# Patient Record
Sex: FEMALE | Race: WHITE | NOT HISPANIC OR LATINO | Employment: FULL TIME | URBAN - METROPOLITAN AREA
[De-identification: names, ages, dates, MRNs, and addresses within clinical notes are randomized per-mention and may not be internally consistent; named-entity substitution may affect disease eponyms.]

---

## 2024-10-30 ENCOUNTER — APPOINTMENT (OUTPATIENT)
Dept: CARDIOLOGY | Facility: HOSPITAL | Age: 39
End: 2024-10-30
Payer: COMMERCIAL

## 2024-10-30 ENCOUNTER — APPOINTMENT (OUTPATIENT)
Dept: GENERAL RADIOLOGY | Facility: HOSPITAL | Age: 39
End: 2024-10-30
Payer: COMMERCIAL

## 2024-10-30 ENCOUNTER — HOSPITAL ENCOUNTER (EMERGENCY)
Facility: HOSPITAL | Age: 39
Discharge: HOME | End: 2024-10-30
Attending: EMERGENCY MEDICINE
Payer: COMMERCIAL

## 2024-10-30 VITALS
RESPIRATION RATE: 16 BRPM | SYSTOLIC BLOOD PRESSURE: 103 MMHG | TEMPERATURE: 98.3 F | HEIGHT: 65 IN | BODY MASS INDEX: 39.15 KG/M2 | HEART RATE: 72 BPM | OXYGEN SATURATION: 94 % | DIASTOLIC BLOOD PRESSURE: 63 MMHG | WEIGHT: 235 LBS

## 2024-10-30 DIAGNOSIS — M79.89 LEG SWELLING: ICD-10-CM

## 2024-10-30 DIAGNOSIS — L03.115 CELLULITIS OF RIGHT LOWER EXTREMITY: Primary | ICD-10-CM

## 2024-10-30 LAB
ANION GAP SERPL CALCULATED.3IONS-SCNC: 6.6 MMOL/L (ref 5–15)
BASOPHILS # BLD AUTO: 0.03 10*3/MM3 (ref 0–0.2)
BASOPHILS NFR BLD AUTO: 0.6 % (ref 0–1.5)
BH CV LOWER VASCULAR LEFT COMMON FEMORAL AUGMENT: NORMAL
BH CV LOWER VASCULAR LEFT COMMON FEMORAL COMPETENT: NORMAL
BH CV LOWER VASCULAR LEFT COMMON FEMORAL COMPRESS: NORMAL
BH CV LOWER VASCULAR LEFT COMMON FEMORAL PHASIC: NORMAL
BH CV LOWER VASCULAR LEFT COMMON FEMORAL SPONT: NORMAL
BH CV LOWER VASCULAR RIGHT COMMON FEMORAL AUGMENT: NORMAL
BH CV LOWER VASCULAR RIGHT COMMON FEMORAL COMPETENT: NORMAL
BH CV LOWER VASCULAR RIGHT COMMON FEMORAL COMPRESS: NORMAL
BH CV LOWER VASCULAR RIGHT COMMON FEMORAL PHASIC: NORMAL
BH CV LOWER VASCULAR RIGHT COMMON FEMORAL SPONT: NORMAL
BH CV LOWER VASCULAR RIGHT DISTAL FEMORAL COMPRESS: NORMAL
BH CV LOWER VASCULAR RIGHT GASTRONEMIUS COMPRESS: NORMAL
BH CV LOWER VASCULAR RIGHT GREATER SAPH AK COMPRESS: NORMAL
BH CV LOWER VASCULAR RIGHT GREATER SAPH BK COMPRESS: NORMAL
BH CV LOWER VASCULAR RIGHT LESSER SAPH COMPRESS: NORMAL
BH CV LOWER VASCULAR RIGHT MID FEMORAL AUGMENT: NORMAL
BH CV LOWER VASCULAR RIGHT MID FEMORAL COMPETENT: NORMAL
BH CV LOWER VASCULAR RIGHT MID FEMORAL COMPRESS: NORMAL
BH CV LOWER VASCULAR RIGHT MID FEMORAL PHASIC: NORMAL
BH CV LOWER VASCULAR RIGHT MID FEMORAL SPONT: NORMAL
BH CV LOWER VASCULAR RIGHT PERONEAL COMPRESS: NORMAL
BH CV LOWER VASCULAR RIGHT POPLITEAL AUGMENT: NORMAL
BH CV LOWER VASCULAR RIGHT POPLITEAL COMPETENT: NORMAL
BH CV LOWER VASCULAR RIGHT POPLITEAL COMPRESS: NORMAL
BH CV LOWER VASCULAR RIGHT POPLITEAL PHASIC: NORMAL
BH CV LOWER VASCULAR RIGHT POPLITEAL SPONT: NORMAL
BH CV LOWER VASCULAR RIGHT POSTERIOR TIBIAL COMPRESS: NORMAL
BH CV LOWER VASCULAR RIGHT PROXIMAL FEMORAL COMPRESS: NORMAL
BH CV LOWER VASCULAR RIGHT SAPHENOFEMORAL JUNCTION COMPRESS: NORMAL
BH CV VAS PRELIMINARY FINDINGS SCRIPTING: 1
BUN SERPL-MCNC: 11 MG/DL (ref 6–20)
BUN/CREAT SERPL: 12.1 (ref 7–25)
CALCIUM SPEC-SCNC: 8.8 MG/DL (ref 8.6–10.5)
CHLORIDE SERPL-SCNC: 101 MMOL/L (ref 98–107)
CO2 SERPL-SCNC: 33.4 MMOL/L (ref 22–29)
CREAT SERPL-MCNC: 0.91 MG/DL (ref 0.57–1)
DEPRECATED RDW RBC AUTO: 48.6 FL (ref 37–54)
EGFRCR SERPLBLD CKD-EPI 2021: 82 ML/MIN/1.73
EOSINOPHIL # BLD AUTO: 0.27 10*3/MM3 (ref 0–0.4)
EOSINOPHIL NFR BLD AUTO: 5.3 % (ref 0.3–6.2)
ERYTHROCYTE [DISTWIDTH] IN BLOOD BY AUTOMATED COUNT: 14.7 % (ref 12.3–15.4)
GLUCOSE SERPL-MCNC: 67 MG/DL (ref 65–99)
HCT VFR BLD AUTO: 36.2 % (ref 34–46.6)
HGB BLD-MCNC: 11.2 G/DL (ref 12–15.9)
HOLD SPECIMEN: NORMAL
IMM GRANULOCYTES # BLD AUTO: 0.03 10*3/MM3 (ref 0–0.05)
IMM GRANULOCYTES NFR BLD AUTO: 0.6 % (ref 0–0.5)
LYMPHOCYTES # BLD AUTO: 1.36 10*3/MM3 (ref 0.7–3.1)
LYMPHOCYTES NFR BLD AUTO: 26.5 % (ref 19.6–45.3)
MCH RBC QN AUTO: 27.5 PG (ref 26.6–33)
MCHC RBC AUTO-ENTMCNC: 30.9 G/DL (ref 31.5–35.7)
MCV RBC AUTO: 88.9 FL (ref 79–97)
MONOCYTES # BLD AUTO: 0.35 10*3/MM3 (ref 0.1–0.9)
MONOCYTES NFR BLD AUTO: 6.8 % (ref 5–12)
NEUTROPHILS NFR BLD AUTO: 3.1 10*3/MM3 (ref 1.7–7)
NEUTROPHILS NFR BLD AUTO: 60.2 % (ref 42.7–76)
NRBC BLD AUTO-RTO: 0 /100 WBC (ref 0–0.2)
PLATELET # BLD AUTO: 179 10*3/MM3 (ref 140–450)
PMV BLD AUTO: 9.8 FL (ref 6–12)
POTASSIUM SERPL-SCNC: 3.9 MMOL/L (ref 3.5–5.2)
RBC # BLD AUTO: 4.07 10*6/MM3 (ref 3.77–5.28)
SODIUM SERPL-SCNC: 141 MMOL/L (ref 136–145)
WBC NRBC COR # BLD AUTO: 5.14 10*3/MM3 (ref 3.4–10.8)
WHOLE BLOOD HOLD COAG: NORMAL

## 2024-10-30 PROCEDURE — 93971 EXTREMITY STUDY: CPT

## 2024-10-30 PROCEDURE — 99284 EMERGENCY DEPT VISIT MOD MDM: CPT

## 2024-10-30 PROCEDURE — 85025 COMPLETE CBC W/AUTO DIFF WBC: CPT | Performed by: EMERGENCY MEDICINE

## 2024-10-30 PROCEDURE — 80048 BASIC METABOLIC PNL TOTAL CA: CPT | Performed by: EMERGENCY MEDICINE

## 2024-10-30 PROCEDURE — 73590 X-RAY EXAM OF LOWER LEG: CPT

## 2024-10-30 PROCEDURE — 93971 EXTREMITY STUDY: CPT | Performed by: SURGERY

## 2024-10-30 RX ORDER — CEPHALEXIN 500 MG/1
500 CAPSULE ORAL 4 TIMES DAILY
Qty: 28 CAPSULE | Refills: 0 | Status: SHIPPED | OUTPATIENT
Start: 2024-10-30

## 2024-10-30 RX ORDER — SODIUM CHLORIDE 0.9 % (FLUSH) 0.9 %
10 SYRINGE (ML) INJECTION AS NEEDED
Status: DISCONTINUED | OUTPATIENT
Start: 2024-10-30 | End: 2024-10-30 | Stop reason: HOSPADM

## 2024-10-30 NOTE — ED PROVIDER NOTES
Subjective   History of Present Illness  Chief complaint: Patient is a 39-year-old female who presents to the emergency department with pain and swelling to the right lower leg.  She states she had a mechanical fall a week ago.  After she fell she never thought anything of it.  She was not having any pain.  A few days ago she states she was sleeping with the right side of her leg hanging off of the bed.  When she awoke she noticed it was swollen.  Its persistently swollen and has had some erythema and redness to it.  She has no numbness or weakness.  She is having increasing pain.  She is not having any chest pain or shortness of breath.  She presented for further evaluation.    Context:    Duration:    Timing:    Severity:    Associated Symptoms:        PCP:  LMP:      Review of Systems   Respiratory:  Negative for shortness of breath.    Cardiovascular:  Negative for chest pain.   Gastrointestinal: Negative.    Genitourinary: Negative.    Musculoskeletal:  Positive for arthralgias and myalgias.   Skin:  Positive for color change.   Neurological:  Negative for weakness and numbness.       Past Medical History:   Diagnosis Date    Allergies        No Known Allergies    Past Surgical History:   Procedure Laterality Date    ABDOMINAL SURGERY  09/03/2022    Lap Nica    CHOLECYSTECTOMY N/A 09/03/2022    Procedure: CHOLECYSTECTOMY LAPAROSCOPIC;  Surgeon: Vikas Hurtado MD;  Location: [facility] OR;  Service: General;  Laterality: N/A;    DILATATION AND CURETTAGE  09/17/2010       Family History   Problem Relation Age of Onset    Alcaraz-Pj syndrome Mother        Social History     Socioeconomic History    Marital status:    Tobacco Use    Smoking status: Every Day     Current packs/day: 0.50     Types: Cigarettes    Smokeless tobacco: Never   Vaping Use    Vaping status: Never Used   Substance and Sexual Activity    Alcohol use: Never    Drug use: Defer    Sexual activity: Defer     Partners: Male            Objective   Physical Exam  Vitals and nursing note reviewed.   Constitutional:       Appearance: Normal appearance.   HENT:      Head: Normocephalic and atraumatic.   Musculoskeletal:      Right lower leg: Swelling present. Edema present.        Legs:       Comments: Tenderness posterior calf on the right.  There is edema and soft tissue swelling circumferentially from the lower calf down to the foot.  Neurovascular intact distally.   Skin:     General: Skin is warm.      Findings: Erythema present.          Neurological:      Mental Status: She is alert.         Procedures           ED Course                                     Results for orders placed or performed during the hospital encounter of 10/30/24   Light Blue Top    Collection Time: 10/30/24 10:20 AM   Result Value Ref Range    Extra Tube Hold for add-ons.    Basic Metabolic Panel    Collection Time: 10/30/24 10:25 AM    Specimen: Blood   Result Value Ref Range    Glucose 67 65 - 99 mg/dL    BUN 11 6 - 20 mg/dL    Creatinine 0.91 0.57 - 1.00 mg/dL    Sodium 141 136 - 145 mmol/L    Potassium 3.9 3.5 - 5.2 mmol/L    Chloride 101 98 - 107 mmol/L    CO2 33.4 (H) 22.0 - 29.0 mmol/L    Calcium 8.8 8.6 - 10.5 mg/dL    BUN/Creatinine Ratio 12.1 7.0 - 25.0    Anion Gap 6.6 5.0 - 15.0 mmol/L    eGFR 82.0 >60.0 mL/min/1.73   CBC Auto Differential    Collection Time: 10/30/24 10:25 AM    Specimen: Blood   Result Value Ref Range    WBC 5.14 3.40 - 10.80 10*3/mm3    RBC 4.07 3.77 - 5.28 10*6/mm3    Hemoglobin 11.2 (L) 12.0 - 15.9 g/dL    Hematocrit 36.2 34.0 - 46.6 %    MCV 88.9 79.0 - 97.0 fL    MCH 27.5 26.6 - 33.0 pg    MCHC 30.9 (L) 31.5 - 35.7 g/dL    RDW 14.7 12.3 - 15.4 %    RDW-SD 48.6 37.0 - 54.0 fl    MPV 9.8 6.0 - 12.0 fL    Platelets 179 140 - 450 10*3/mm3    Neutrophil % 60.2 42.7 - 76.0 %    Lymphocyte % 26.5 19.6 - 45.3 %    Monocyte % 6.8 5.0 - 12.0 %    Eosinophil % 5.3 0.3 - 6.2 %    Basophil % 0.6 0.0 - 1.5 %    Immature Grans % 0.6 (H)  0.0 - 0.5 %    Neutrophils, Absolute 3.10 1.70 - 7.00 10*3/mm3    Lymphocytes, Absolute 1.36 0.70 - 3.10 10*3/mm3    Monocytes, Absolute 0.35 0.10 - 0.90 10*3/mm3    Eosinophils, Absolute 0.27 0.00 - 0.40 10*3/mm3    Basophils, Absolute 0.03 0.00 - 0.20 10*3/mm3    Immature Grans, Absolute 0.03 0.00 - 0.05 10*3/mm3    nRBC 0.0 0.0 - 0.2 /100 WBC   Gold Top - SST    Collection Time: 10/30/24 10:25 AM   Result Value Ref Range    Extra Tube Hold for add-ons.    Duplex Venous Lower Extremity - RIGHT    Collection Time: 10/30/24 11:07 AM   Result Value Ref Range    Right Common Femoral Spont Y     Right Common Femoral Competent Y     Right Common Femoral Phasic Y     Right Common Femoral Compress C     Right Common Femoral Augment Y     Right Saphenofemoral Junction Compress C     Right Proximal Femoral Compress C     Right Mid Femoral Spont Y     Right Mid Femoral Competent Y     Right Mid Femoral Phasic Y     Right Mid Femoral Compress C     Right Mid Femoral Augment Y     Right Distal Femoral Compress C     Right Popliteal Spont Y     Right Popliteal Competent Y     Right Popliteal Phasic Y     Right Popliteal Compress C     Right Popliteal Augment Y     Right Posterior Tibial Compress C     Right Peroneal Compress C     Right Gastronemius Compress C     Right Greater Saph AK Compress C     Right Greater Saph BK Compress C     Right Lesser Saph Compress C     Left Common Femoral Spont Y     Left Common Femoral Competent Y     Left Common Femoral Phasic Y     Left Common Femoral Compress C     Left Common Femoral Augment Y     BH CV VAS PRELIMINARY FINDINGS SCRIPTING 1.0            XR Tibia Fibula 2 View Right    Result Date: 10/30/2024  Impression: Normal 2 views of the right tibia and fibula. Electronically Signed: Roselia Childress MD  10/30/2024 10:42 AM EDT  Workstation ID: [id number]       Medical Decision Making  Patient was seen evaluate for the above problem    Differential diagnosis includes but is not limited to  fibula fracture, tibia fracture, cellulitis, DVT    Patient had dopplerable pulses.  She does have significant soft tissue swelling to the distal leg.  There is some skin breakdown from superficial wound and erythema circumferential as well.  Patient states her tetanus is up-to-date.  I am concerned about bacterial invasion of the skin and cellulitis.  She is not febrile at this point.  However she did obtain ultrasound that shows no signs of any DVT.  She is not having any chest pain or shortness of breath.  Her x-ray reveals no fracture.  She will be treated with antibiotics and follow-up outpatient return for worsening symptoms signs or concerns.  She has good flow distally with adequate capillary refill.  Dopplerable pulses present.    Problems Addressed:  Cellulitis of right lower extremity: complicated acute illness or injury  Leg swelling: complicated acute illness or injury    Amount and/or Complexity of Data Reviewed  Labs: ordered. Decision-making details documented in ED Course.     Details: Labs reviewed by myself  Radiology: ordered and independent interpretation performed.     Details: Ultrasound and x-ray reviewed by myself as above    Risk  Prescription drug management.        Final diagnoses:   None     Right leg swelling  Cellulitis    ED Disposition  ED Disposition       None            No follow-up provider specified.       Medication List      No changes were made to your prescriptions during this visit.            Rickie Luis,   10/30/24 0293

## 2024-11-20 ENCOUNTER — HOSPITAL ENCOUNTER (EMERGENCY)
Facility: HOSPITAL | Age: 39
Discharge: ANOTHER HOSPITAL | End: 2024-11-20
Attending: EMERGENCY MEDICINE | Admitting: EMERGENCY MEDICINE
Payer: COMMERCIAL

## 2024-11-20 ENCOUNTER — APPOINTMENT (OUTPATIENT)
Dept: CT IMAGING | Facility: HOSPITAL | Age: 39
End: 2024-11-20
Payer: COMMERCIAL

## 2024-11-20 VITALS
OXYGEN SATURATION: 96 % | SYSTOLIC BLOOD PRESSURE: 153 MMHG | HEART RATE: 93 BPM | RESPIRATION RATE: 17 BRPM | DIASTOLIC BLOOD PRESSURE: 97 MMHG | BODY MASS INDEX: 39.3 KG/M2 | HEIGHT: 65 IN | TEMPERATURE: 98.1 F | WEIGHT: 235.89 LBS

## 2024-11-20 DIAGNOSIS — F11.20 METHADONE DEPENDENCE: ICD-10-CM

## 2024-11-20 DIAGNOSIS — F19.90 SUBSTANCE USE DISORDER: ICD-10-CM

## 2024-11-20 DIAGNOSIS — J01.00 ACUTE NON-RECURRENT MAXILLARY SINUSITIS: ICD-10-CM

## 2024-11-20 DIAGNOSIS — F23 ACUTE PSYCHOSIS: Primary | ICD-10-CM

## 2024-11-20 LAB
ALBUMIN SERPL-MCNC: 4.9 G/DL (ref 3.5–5.2)
ALBUMIN/GLOB SERPL: 1.6 G/DL
ALP SERPL-CCNC: 174 U/L (ref 39–117)
ALT SERPL W P-5'-P-CCNC: 12 U/L (ref 1–33)
AMMONIA BLD-SCNC: 16 UMOL/L (ref 11–51)
AMPHET+METHAMPHET UR QL: NEGATIVE
AMPHETAMINES UR QL: NEGATIVE
ANION GAP SERPL CALCULATED.3IONS-SCNC: 13.9 MMOL/L (ref 5–15)
APAP SERPL-MCNC: <5 MCG/ML (ref 0–30)
AST SERPL-CCNC: 22 U/L (ref 1–32)
B-HCG UR QL: NEGATIVE
BACTERIA UR QL AUTO: ABNORMAL /HPF
BARBITURATES UR QL SCN: NEGATIVE
BASOPHILS # BLD AUTO: 0.03 10*3/MM3 (ref 0–0.2)
BASOPHILS NFR BLD AUTO: 0.2 % (ref 0–1.5)
BENZODIAZ UR QL SCN: NEGATIVE
BILIRUB SERPL-MCNC: 0.6 MG/DL (ref 0–1.2)
BILIRUB UR QL STRIP: NEGATIVE
BUN SERPL-MCNC: 9 MG/DL (ref 6–20)
BUN/CREAT SERPL: 9.6 (ref 7–25)
BUPRENORPHINE SERPL-MCNC: NEGATIVE NG/ML
CALCIUM SPEC-SCNC: 9.8 MG/DL (ref 8.6–10.5)
CANNABINOIDS SERPL QL: NEGATIVE
CHLORIDE SERPL-SCNC: 96 MMOL/L (ref 98–107)
CK SERPL-CCNC: 36 U/L (ref 20–180)
CLARITY UR: CLEAR
CO2 SERPL-SCNC: 29.1 MMOL/L (ref 22–29)
COCAINE UR QL: NEGATIVE
COLOR UR: YELLOW
CREAT SERPL-MCNC: 0.94 MG/DL (ref 0.57–1)
DEPRECATED RDW RBC AUTO: 46.9 FL (ref 37–54)
EGFRCR SERPLBLD CKD-EPI 2021: 78.8 ML/MIN/1.73
EOSINOPHIL # BLD AUTO: 0 10*3/MM3 (ref 0–0.4)
EOSINOPHIL NFR BLD AUTO: 0 % (ref 0.3–6.2)
ERYTHROCYTE [DISTWIDTH] IN BLOOD BY AUTOMATED COUNT: 15.2 % (ref 12.3–15.4)
ETHANOL UR QL: <0.01 %
FLUAV SUBTYP SPEC NAA+PROBE: NOT DETECTED
FLUBV RNA ISLT QL NAA+PROBE: NOT DETECTED
GLOBULIN UR ELPH-MCNC: 3.1 GM/DL
GLUCOSE SERPL-MCNC: 124 MG/DL (ref 65–99)
GLUCOSE UR STRIP-MCNC: NEGATIVE MG/DL
HCT VFR BLD AUTO: 43.9 % (ref 34–46.6)
HGB BLD-MCNC: 14 G/DL (ref 12–15.9)
HGB UR QL STRIP.AUTO: NEGATIVE
HIV 1+2 AB+HIV1 P24 AG SERPL QL IA: NORMAL
HYALINE CASTS UR QL AUTO: ABNORMAL /LPF
IMM GRANULOCYTES # BLD AUTO: 0.05 10*3/MM3 (ref 0–0.05)
IMM GRANULOCYTES NFR BLD AUTO: 0.4 % (ref 0–0.5)
KETONES UR QL STRIP: ABNORMAL
LEUKOCYTE ESTERASE UR QL STRIP.AUTO: ABNORMAL
LYMPHOCYTES # BLD AUTO: 1.55 10*3/MM3 (ref 0.7–3.1)
LYMPHOCYTES NFR BLD AUTO: 12.5 % (ref 19.6–45.3)
MAGNESIUM SERPL-MCNC: 2.1 MG/DL (ref 1.6–2.6)
MCH RBC QN AUTO: 27.1 PG (ref 26.6–33)
MCHC RBC AUTO-ENTMCNC: 31.9 G/DL (ref 31.5–35.7)
MCV RBC AUTO: 84.9 FL (ref 79–97)
METHADONE UR QL SCN: POSITIVE
MONOCYTES # BLD AUTO: 0.46 10*3/MM3 (ref 0.1–0.9)
MONOCYTES NFR BLD AUTO: 3.7 % (ref 5–12)
NEUTROPHILS NFR BLD AUTO: 10.31 10*3/MM3 (ref 1.7–7)
NEUTROPHILS NFR BLD AUTO: 83.2 % (ref 42.7–76)
NITRITE UR QL STRIP: NEGATIVE
NRBC BLD AUTO-RTO: 0 /100 WBC (ref 0–0.2)
OPIATES UR QL: NEGATIVE
OXYCODONE UR QL SCN: NEGATIVE
PCP UR QL SCN: NEGATIVE
PH UR STRIP.AUTO: 7 [PH] (ref 5–8)
PHOSPHATE SERPL-MCNC: 2.8 MG/DL (ref 2.5–4.5)
PLATELET # BLD AUTO: 324 10*3/MM3 (ref 140–450)
PMV BLD AUTO: 9.9 FL (ref 6–12)
POTASSIUM SERPL-SCNC: 3.8 MMOL/L (ref 3.5–5.2)
PROT SERPL-MCNC: 8 G/DL (ref 6–8.5)
PROT UR QL STRIP: ABNORMAL
RBC # BLD AUTO: 5.17 10*6/MM3 (ref 3.77–5.28)
RBC # UR STRIP: ABNORMAL /HPF
REF LAB TEST METHOD: ABNORMAL
SALICYLATES SERPL-MCNC: <0.5 MG/DL
SARS-COV-2 RNA NPH QL NAA+NON-PROBE: NOT DETECTED
SODIUM SERPL-SCNC: 139 MMOL/L (ref 136–145)
SP GR UR STRIP: 1.02 (ref 1–1.03)
SQUAMOUS #/AREA URNS HPF: ABNORMAL /HPF
TRICYCLICS UR QL SCN: NEGATIVE
TSH SERPL DL<=0.05 MIU/L-ACNC: 1.2 UIU/ML (ref 0.27–4.2)
UROBILINOGEN UR QL STRIP: ABNORMAL
WBC # UR STRIP: ABNORMAL /HPF
WBC NRBC COR # BLD AUTO: 12.4 10*3/MM3 (ref 3.4–10.8)

## 2024-11-20 PROCEDURE — 25010000002 THIAMINE HCL 200 MG/2ML SOLUTION: Performed by: EMERGENCY MEDICINE

## 2024-11-20 PROCEDURE — 96365 THER/PROPH/DIAG IV INF INIT: CPT

## 2024-11-20 PROCEDURE — 81025 URINE PREGNANCY TEST: CPT | Performed by: EMERGENCY MEDICINE

## 2024-11-20 PROCEDURE — 70450 CT HEAD/BRAIN W/O DYE: CPT

## 2024-11-20 PROCEDURE — 82077 ASSAY SPEC XCP UR&BREATH IA: CPT | Performed by: EMERGENCY MEDICINE

## 2024-11-20 PROCEDURE — 80179 DRUG ASSAY SALICYLATE: CPT | Performed by: EMERGENCY MEDICINE

## 2024-11-20 PROCEDURE — 83735 ASSAY OF MAGNESIUM: CPT | Performed by: EMERGENCY MEDICINE

## 2024-11-20 PROCEDURE — 80050 GENERAL HEALTH PANEL: CPT | Performed by: EMERGENCY MEDICINE

## 2024-11-20 PROCEDURE — 82140 ASSAY OF AMMONIA: CPT | Performed by: EMERGENCY MEDICINE

## 2024-11-20 PROCEDURE — 96375 TX/PRO/DX INJ NEW DRUG ADDON: CPT

## 2024-11-20 PROCEDURE — 80306 DRUG TEST PRSMV INSTRMNT: CPT | Performed by: EMERGENCY MEDICINE

## 2024-11-20 PROCEDURE — 84100 ASSAY OF PHOSPHORUS: CPT | Performed by: EMERGENCY MEDICINE

## 2024-11-20 PROCEDURE — 25810000003 SODIUM CHLORIDE 0.9 % SOLUTION: Performed by: EMERGENCY MEDICINE

## 2024-11-20 PROCEDURE — G0432 EIA HIV-1/HIV-2 SCREEN: HCPCS | Performed by: EMERGENCY MEDICINE

## 2024-11-20 PROCEDURE — 96367 TX/PROPH/DG ADDL SEQ IV INF: CPT

## 2024-11-20 PROCEDURE — 99285 EMERGENCY DEPT VISIT HI MDM: CPT

## 2024-11-20 PROCEDURE — 80143 DRUG ASSAY ACETAMINOPHEN: CPT | Performed by: EMERGENCY MEDICINE

## 2024-11-20 PROCEDURE — 81001 URINALYSIS AUTO W/SCOPE: CPT | Performed by: EMERGENCY MEDICINE

## 2024-11-20 PROCEDURE — 82550 ASSAY OF CK (CPK): CPT | Performed by: EMERGENCY MEDICINE

## 2024-11-20 PROCEDURE — 25010000002 CEFTRIAXONE PER 250 MG: Performed by: EMERGENCY MEDICINE

## 2024-11-20 RX ORDER — SODIUM CHLORIDE 9 MG/ML
1000 INJECTION, SOLUTION INTRAVENOUS ONCE
Status: COMPLETED | OUTPATIENT
Start: 2024-11-20 | End: 2024-11-20

## 2024-11-20 RX ORDER — THIAMINE HYDROCHLORIDE 100 MG/ML
200 INJECTION, SOLUTION INTRAMUSCULAR; INTRAVENOUS ONCE
Status: COMPLETED | OUTPATIENT
Start: 2024-11-20 | End: 2024-11-20

## 2024-11-20 RX ADMIN — CEFTRIAXONE 2000 MG: 2 INJECTION, POWDER, FOR SOLUTION INTRAMUSCULAR; INTRAVENOUS at 20:36

## 2024-11-20 RX ADMIN — THIAMINE HYDROCHLORIDE 200 MG: 100 INJECTION, SOLUTION INTRAMUSCULAR; INTRAVENOUS at 18:48

## 2024-11-20 RX ADMIN — SODIUM CHLORIDE 1000 ML: 9 INJECTION, SOLUTION INTRAVENOUS at 18:43

## 2024-11-20 RX ADMIN — FOLIC ACID 1 MG: 5 INJECTION, SOLUTION INTRAMUSCULAR; INTRAVENOUS; SUBCUTANEOUS at 19:12

## 2024-11-20 NOTE — ED NOTES
Sent from [facility] for delusions pt states I think I'm depressed. Pt appears agitated but cooperative

## 2024-11-21 NOTE — ED PROVIDER NOTES
Subjective   History of Present Illness  39-year-old female with the onset of auditory hallucinations within the last 24 hours.  The patient states she has not had much sleep in the last 2 days.  She has no previous diagnosis of bipolar disorder or kyle.  The patient states she does not think she has had auditory hallucinations in the past she states are not telling her to do any things but they are talking bad about her.  She states that she has had a history of methadone use on a daily basis she also states she recently has used heroin as recently as a week ago.  The patient reports that she has had no neck pain or stiffness she denies fever chills she reports no intent to harm others she denies visual hallucinations she reports that she does not have headache although she states that when she leans forward she feels pressure in her face and cheek area and has had recent congestion.  She denies suicidal ideation but states the voices are saying derogatory things about her      Review of Systems   Unable to perform ROS: Psychiatric disorder       Past Medical History:   Diagnosis Date    Allergies        No Known Allergies    Past Surgical History:   Procedure Laterality Date    ABDOMINAL SURGERY  09/03/2022    Lap Nica    CHOLECYSTECTOMY N/A 09/03/2022    Procedure: CHOLECYSTECTOMY LAPAROSCOPIC;  Surgeon: Vikas Hurtado MD;  Location: [facility] OR;  Service: General;  Laterality: N/A;    DILATATION AND CURETTAGE  09/17/2010       Family History   Problem Relation Age of Onset    Alcaraz-Pj syndrome Mother        Social History     Socioeconomic History    Marital status:    Tobacco Use    Smoking status: Every Day     Current packs/day: 0.50     Types: Cigarettes    Smokeless tobacco: Never   Vaping Use    Vaping status: Never Used   Substance and Sexual Activity    Alcohol use: Never    Drug use: Defer    Sexual activity: Defer     Partners: Male       States that her significant other recently  had a cardiac arrest and had placement of a AICD.  She states that she is under a lot of stress every day    Objective   Physical Exam  Alert Eddy Coma Scale 15 presents with auditory hallucination.  Depressed mood with some agitation states that she feels fatigue   HEENT: Pupils equal and reactive to light. Conjunctivae are not injected. Normal tympanic membranes. Oropharynx and nares are normal.  Tenderness to percussion over maxillary sinuses   Neck: Supple. Midline trachea. No JVD. No goiter.  Negative Kernig and Burzynski  Chest: Clear and equal breath sounds bilaterally, regular rate and rhythm without murmur or rub.   Abdomen: Positive bowel sounds, nontender, nondistended. No rebound or peritoneal signs. No CVA tenderness.   Extremities/neuro no focal deficits identified cranial nerves intact no neglect or drift no clubbing. cyanosis or edema. Motor sensory exam is normal. The full range of motion is intact   Skin: Warm and dry, no rashes or petechia.   Lymphatic: No regional lymphadenopathy. No calf pain, swelling or Homans sign    Procedures           ED Course      Labs Reviewed   URINE DRUG SCREEN - Abnormal; Notable for the following components:       Result Value    Methadone Screen, Urine Positive (*)     All other components within normal limits    Narrative:     Cutoff For Drugs Screened:    Amphetamines               500 ng/ml  Barbiturates               200 ng/ml  Benzodiazepines            150 ng/ml  Cocaine                    150 ng/ml  Methadone                  200 ng/ml  Opiates                    100 ng/ml  Phencyclidine               25 ng/ml  THC                         50 ng/ml  Methamphetamine            500 ng/ml  Tricyclic Antidepressants  300 ng/ml  Oxycodone                  100 ng/ml  Buprenorphine               10 ng/ml    The normal value for all drugs tested is negative. This report includes unconfirmed screening results, with the cutoff values listed, to be used for medical  treatment purposes only.  Unconfirmed results must not be used for non-medical purposes such as employment or legal testing.  Clinical consideration should be applied to any drug of abuse test, particularly when unconfirmed results are used.    All urine drugs of abuse requests without chain of custody are for medical screening purposes only.  False positives are possible.     URINALYSIS W/ MICROSCOPIC IF INDICATED (NO CULTURE) - Abnormal; Notable for the following components:    Ketones, UA 40 mg/dL (2+) (*)     Protein, UA Trace (*)     Leuk Esterase, UA Trace (*)     Urobilinogen, UA 2.0 E.U./dL (*)     All other components within normal limits   COMPREHENSIVE METABOLIC PANEL - Abnormal; Notable for the following components:    Glucose 124 (*)     Chloride 96 (*)     CO2 29.1 (*)     Alkaline Phosphatase 174 (*)     All other components within normal limits    Narrative:     GFR Normal >60  Chronic Kidney Disease <60  Kidney Failure <15     CBC WITH AUTO DIFFERENTIAL - Abnormal; Notable for the following components:    WBC 12.40 (*)     Neutrophil % 83.2 (*)     Lymphocyte % 12.5 (*)     Monocyte % 3.7 (*)     Eosinophil % 0.0 (*)     Neutrophils, Absolute 10.31 (*)     All other components within normal limits   URINALYSIS, MICROSCOPIC ONLY - Abnormal; Notable for the following components:    Bacteria, UA Trace (*)     Squamous Epithelial Cells, UA 7-12 (*)     All other components within normal limits   COVID-19 AND FLU A/B, NP SWAB IN TRANSPORT MEDIA 1 HR TAT - Normal   PREGNANCY, URINE - Normal   CK - Normal   AMMONIA - Normal   MAGNESIUM - Normal   PHOSPHORUS - Normal   ACETAMINOPHEN LEVEL - Normal    Narrative:     Acetaminophen Therapeutic Range  5-20 ug/mL      Hours after ingestion            Toxic Value    4 Hours                           150 ug/mL    8 Hours                            70 ug/mL   12 Hours                            40 ug/mL   16 Hours                            20 ug/mL    These values  apply to a single ingestion only.    SALICYLATE LEVEL - Normal   HIV-1/O/2 ANTIGEN/ANTIBODY - Normal    Narrative:     The HIV antibody/antigen combo assay is a qualitative assay for HIV that includes the p24 antigen as well as antibodies to HIV types 1 and 2. This test is intended to be used as a screening assay in the diagnosis of HIV infection in patients over the age of 2.   TSH RFX ON ABNORMAL TO FREE T4 - Normal   HIV-1 AND HIV-2 ANTIBODIES    Narrative:     The following orders were created for panel order HIV-1 & HIV-2 Antibodies.  Procedure                               Abnormality         Status                     ---------                               -----------         ------                     HIV-1 / O / 2 Ag / Antibody[[id number]]  Normal              Final result                 Please view results for these tests on the individual orders.   ETHANOL    Narrative:     Plasma Ethanol Clinical Symptoms:    ETOH (%)               Clinical Symptom  .01-.05              No apparent influence  .03-.12              Euphoria, Diminished judgment and attention   .09-.25              Impaired comprehension, Muscle incoordination  .18-.30              Confusion, Staggered gait, Slurred speech  .25-.40              Markedly decreased response to stimuli, unable to stand or                        walk, vomitting, sleep or stupor  .35-.50              Comatose, Anesthesia, Subnormal body temperature       CBC AND DIFFERENTIAL    Narrative:     The following orders were created for panel order CBC & Differential.  Procedure                               Abnormality         Status                     ---------                               -----------         ------                     CBC Auto Differential[[id number]]        Abnormal            Final result                 Please view results for these tests on the individual orders.     .EDS  CT Head Without Contrast    Result Date: 11/20/2024  Impression: 1.No  CT findings of acute intracranial abnormality. 2.Paranasal sinus mucosal thickening with near complete opacification of the left maxillary sinus and bubbly fluid. Findings could indicate acute sinusitis. 3.Partial opacification of left mastoid air cells. Electronically Signed: Chase Looney  11/20/2024 8:24 PM EST  Workstation ID: [id number]                                                      Medical Decision Making  The case was discussed and will be transferred back to [facility] with Dr. Ag as the receiving doctor in unit 2.  The patient will be placed on Augmentin 875 mg p.o. twice daily for 7 days for the maxillary sinusitis.  The patient had no evidence of drug withdrawal at time of evaluation.  The patient was injected ceftriaxone prior to discharge    Amount and/or Complexity of Data Reviewed  Labs: ordered. Decision-making details documented in ED Course.  Radiology: ordered and independent interpretation performed.    Risk  OTC drugs.  Prescription drug management.  Decision regarding hospitalization.        Final diagnoses:   Acute psychosis   Acute non-recurrent maxillary sinusitis   Substance use disorder   Methadone dependence       ED Disposition  ED Disposition       ED Disposition   Transfer to Another Facility     Condition   --    Comment   --               No follow-up provider specified.       Medication List      No changes were made to your prescriptions during this visit.            Yoel Feliz MD  11/22/24 0028

## 2024-11-21 NOTE — ED NOTES
"Physicians Statement of Medical Necessity for  Ambulance Transportation    GENERAL INFORMATION     Name: Jyashree Anderson  YOB: 1985  Medicaid HMO #: [id number]  Transport Date: 11/20/2024 (Valid for round trips this date, or for scheduled repetitive trips for 60 days from the date signed below.)  Origin: [id number]  Destination: [address]  Is the Patient's stay covered under Medicare Part A (PPS/DRG?)No   Closest appropriate facility? Yes  If this a hosp-hosp transfer? Yes, describe services needed at 2nd facility not available at 1st facility [facility]  Is this a hospice patient? No    MEDICAL NECESSITY QUESTIONAIRE    Ambulance Transportation is medically necessary only if other means of transportation are contraindicated or would be potentially harmful to the patient.  To meet this requirement, the patient must be either \"bed confined\" or suffer from a condition such that transport by means other than an ambulance is contraindicated by the patient's condition.  The following questions must be answered by the healthcare professional signing below for this form to be valid:     1) Describe the MEDICAL CONDITION (physical and/or mental) of this patient AT THE TIME OF AMBULANCE TRANSPORT that requires the patient to be transported in an ambulance, and why transport by other means is contraindicated by the patient's condition: ACUTE DELUSIONAL THOUGHTS, SCHIZOPHRENIA  Past Medical History:   Diagnosis Date    Allergies       Past Surgical History:   Procedure Laterality Date    ABDOMINAL SURGERY  09/03/2022    Lap Nica    CHOLECYSTECTOMY N/A 09/03/2022    Procedure: CHOLECYSTECTOMY LAPAROSCOPIC;  Surgeon: Vikas Hurtado MD;  Location: [facility];  Service: General;  Laterality: N/A;    DILATATION AND CURETTAGE  09/17/2010      2) Is this patient \"bed confined\" as defined below?No    To be \"bed confined\" the patient must satisfy all three of the " following criteria:  (1) unable to get up from bed without assistance; AND (2) unable to ambulate;  AND (3) unable to sit in a chair or wheelchair.  3) Can this patient safely be transported by car or wheelchair van (I.e., may safely sit during transport, without an attendant or monitoring?)No   4. In addition to completing questions 1-3 above, please check any of the following conditions that apply*:          *Note: supporting documentation for any boxes checked must be maintained in the patient's medical records Danger to self/other and Medical attendant required      SIGNATURE OF PHYSICIAN OR OTHER AUTHORIZED HEALTHCARE PROFESSIONAL    I certify that the above information is true and correct based on my evaluation of this patient, and represent that the patient requires transport by ambulance and that other forms of transport are contraindicated.  I understand that this information will be used by the Centers for Medicare and Medicaid Services (CMS) to support the determiniation of medical necessity for ambulance services, and I represent that I have personal knowledge of the patient's condition at the time of transport.    NW   If this box is checked, I also certify that the patient is physically or mentally incapable of signing the ambulance service's claim form and that the institution with which I am affiliated has furnished care, services or assistance to the patient.  My signature below is made on behalf of the patient pursuant to 42 .36(b)(4). In accordance with 42 .37, the specific reason(s) that the patient is physically or mentally incapable of signing the claim for is as follows: N/A    Signature of Physician or Healthcare Professional Leeanna Liao RN Date/Time:   11/20/2024 20:41     (For Scheduled repetitive transport, this form is not valid for transports performed more than 60 days after this date).                                                                                                                                             --------------------------------------------------------------------------------------------  Printed Name and Credentials of Physician or Authorized Healthcare Professional     *Form must be signed by patient's attending physician for scheduled, repetitive transports,.  For non-repetitive ambulance transports, if unable to obtain the signature of the attending physician, any of the following may sign (please select below):     Physician  Clinical Nurse Specialist X Registered Nurse     Physician Assistant  Discharge Planner  Licensed Practical Nurse     Nurse Practitioner

## 2025-05-13 ENCOUNTER — APPOINTMENT (OUTPATIENT)
Dept: GENERAL RADIOLOGY | Facility: HOSPITAL | Age: 40
End: 2025-05-13
Payer: COMMERCIAL

## 2025-05-13 ENCOUNTER — HOSPITAL ENCOUNTER (EMERGENCY)
Facility: HOSPITAL | Age: 40
Discharge: HOME | End: 2025-05-13
Attending: EMERGENCY MEDICINE | Admitting: EMERGENCY MEDICINE
Payer: COMMERCIAL

## 2025-05-13 VITALS
RESPIRATION RATE: 16 BRPM | WEIGHT: 238.1 LBS | SYSTOLIC BLOOD PRESSURE: 126 MMHG | OXYGEN SATURATION: 92 % | HEART RATE: 74 BPM | TEMPERATURE: 98 F | HEIGHT: 65 IN | BODY MASS INDEX: 39.67 KG/M2 | DIASTOLIC BLOOD PRESSURE: 84 MMHG

## 2025-05-13 DIAGNOSIS — R53.1 WEAKNESS: Primary | ICD-10-CM

## 2025-05-13 DIAGNOSIS — S50.02XA CONTUSION OF LEFT ELBOW, INITIAL ENCOUNTER: ICD-10-CM

## 2025-05-13 LAB
ANION GAP SERPL CALCULATED.3IONS-SCNC: 4.2 MMOL/L (ref 5–15)
ARTERIAL PATENCY WRIST A: POSITIVE
ATMOSPHERIC PRESS: ABNORMAL MM[HG]
BASE EXCESS BLDA CALC-SCNC: 8.8 MMOL/L (ref 0–3)
BASOPHILS # BLD AUTO: 0.03 10*3/MM3 (ref 0–0.2)
BASOPHILS NFR BLD AUTO: 0.5 % (ref 0–1.5)
BDY SITE: ABNORMAL
BUN SERPL-MCNC: 14 MG/DL (ref 6–20)
BUN/CREAT SERPL: 13.6 (ref 7–25)
CALCIUM SPEC-SCNC: 8.8 MG/DL (ref 8.6–10.5)
CHLORIDE SERPL-SCNC: 105 MMOL/L (ref 98–107)
CO2 BLDA-SCNC: 38.4 MMOL/L (ref 22–29)
CO2 SERPL-SCNC: 35.8 MMOL/L (ref 22–29)
CREAT SERPL-MCNC: 1.03 MG/DL (ref 0.57–1)
D DIMER PPP FEU-MCNC: <0.27 MCGFEU/ML (ref 0–0.5)
DEPRECATED RDW RBC AUTO: 57.4 FL (ref 37–54)
EGFRCR SERPLBLD CKD-EPI 2021: 70.2 ML/MIN/1.73
EOSINOPHIL # BLD AUTO: 0.38 10*3/MM3 (ref 0–0.4)
EOSINOPHIL NFR BLD AUTO: 7 % (ref 0.3–6.2)
ERYTHROCYTE [DISTWIDTH] IN BLOOD BY AUTOMATED COUNT: 18.2 % (ref 12.3–15.4)
GLUCOSE SERPL-MCNC: 104 MG/DL (ref 65–99)
HCO3 BLDA-SCNC: 36.5 MMOL/L (ref 21–28)
HCT VFR BLD AUTO: 39.5 % (ref 34–46.6)
HEMODILUTION: NO
HGB BLD-MCNC: 11.8 G/DL (ref 12–15.9)
HOLD SPECIMEN: NORMAL
IMM GRANULOCYTES # BLD AUTO: 0.01 10*3/MM3 (ref 0–0.05)
IMM GRANULOCYTES NFR BLD AUTO: 0.2 % (ref 0–0.5)
INHALED O2 CONCENTRATION: 28 %
LYMPHOCYTES # BLD AUTO: 1.33 10*3/MM3 (ref 0.7–3.1)
LYMPHOCYTES NFR BLD AUTO: 24.4 % (ref 19.6–45.3)
MCH RBC QN AUTO: 25.7 PG (ref 26.6–33)
MCHC RBC AUTO-ENTMCNC: 29.9 G/DL (ref 31.5–35.7)
MCV RBC AUTO: 85.9 FL (ref 79–97)
MODALITY: ABNORMAL
MONOCYTES # BLD AUTO: 0.38 10*3/MM3 (ref 0.1–0.9)
MONOCYTES NFR BLD AUTO: 7 % (ref 5–12)
NEUTROPHILS NFR BLD AUTO: 3.33 10*3/MM3 (ref 1.7–7)
NEUTROPHILS NFR BLD AUTO: 60.9 % (ref 42.7–76)
NRBC BLD AUTO-RTO: 0 /100 WBC (ref 0–0.2)
PCO2 BLDA: 61.8 MM HG (ref 35–48)
PH BLDA: 7.38 PH UNITS (ref 7.35–7.45)
PLATELET # BLD AUTO: 142 10*3/MM3 (ref 140–450)
PMV BLD AUTO: 10.2 FL (ref 6–12)
PO2 BLD: 271 MM[HG] (ref 0–500)
PO2 BLDA: 75.9 MM HG (ref 83–108)
POIKILOCYTOSIS BLD QL SMEAR: NORMAL
POTASSIUM SERPL-SCNC: 4.1 MMOL/L (ref 3.5–5.2)
RBC # BLD AUTO: 4.6 10*6/MM3 (ref 3.77–5.28)
SAO2 % BLDCOA: 94.2 % (ref 94–98)
SMALL PLATELETS BLD QL SMEAR: ADEQUATE
SODIUM SERPL-SCNC: 145 MMOL/L (ref 136–145)
WBC MORPH BLD: NORMAL
WBC NRBC COR # BLD AUTO: 5.46 10*3/MM3 (ref 3.4–10.8)

## 2025-05-13 PROCEDURE — 99284 EMERGENCY DEPT VISIT MOD MDM: CPT

## 2025-05-13 PROCEDURE — 82803 BLOOD GASES ANY COMBINATION: CPT

## 2025-05-13 PROCEDURE — 85025 COMPLETE CBC W/AUTO DIFF WBC: CPT | Performed by: EMERGENCY MEDICINE

## 2025-05-13 PROCEDURE — 36600 WITHDRAWAL OF ARTERIAL BLOOD: CPT

## 2025-05-13 PROCEDURE — 93005 ELECTROCARDIOGRAM TRACING: CPT | Performed by: EMERGENCY MEDICINE

## 2025-05-13 PROCEDURE — 73070 X-RAY EXAM OF ELBOW: CPT

## 2025-05-13 PROCEDURE — 36415 COLL VENOUS BLD VENIPUNCTURE: CPT

## 2025-05-13 PROCEDURE — 85379 FIBRIN DEGRADATION QUANT: CPT | Performed by: EMERGENCY MEDICINE

## 2025-05-13 PROCEDURE — 85007 BL SMEAR W/DIFF WBC COUNT: CPT | Performed by: EMERGENCY MEDICINE

## 2025-05-13 PROCEDURE — 80048 BASIC METABOLIC PNL TOTAL CA: CPT | Performed by: EMERGENCY MEDICINE

## 2025-05-13 PROCEDURE — 71045 X-RAY EXAM CHEST 1 VIEW: CPT

## 2025-05-13 RX ORDER — SODIUM CHLORIDE 0.9 % (FLUSH) 0.9 %
10 SYRINGE (ML) INJECTION AS NEEDED
Status: DISCONTINUED | OUTPATIENT
Start: 2025-05-13 | End: 2025-05-13 | Stop reason: HOSPADM

## 2025-05-13 NOTE — ED PROVIDER NOTES
Subjective   History of Present Illness  Patient 40-year-old female who states she was feeling dizzy at work when she sat down at some point during.  She felt improved after walking downstairs and slipped and fell and hurt her left elbow.  She complains of generalized weakness for the past several days.  Denies cough fever Harrison diarrhea dysuria or other complaint      Review of Systems    Past Medical History:   Diagnosis Date    Allergies     Bipolar 1 disorder     Depression        No Known Allergies    Past Surgical History:   Procedure Laterality Date    ABDOMINAL SURGERY  09/03/2022    Lap Nica    CHOLECYSTECTOMY N/A 09/03/2022    Procedure: CHOLECYSTECTOMY LAPAROSCOPIC;  Surgeon: Vikas Hurtado MD;  Location: [facility] MAIN OR;  Service: General;  Laterality: N/A;    DILATATION AND CURETTAGE  09/17/2010       Family History   Problem Relation Age of Onset    Alcaraz-Pj syndrome Mother        Social History     Socioeconomic History    Marital status:    Tobacco Use    Smoking status: Every Day     Current packs/day: 0.50     Types: Cigarettes     Passive exposure: Current    Smokeless tobacco: Never   Vaping Use    Vaping status: Never Used   Substance and Sexual Activity    Alcohol use: Never    Drug use: Not Currently     Comment: heroine    Sexual activity: Defer     Partners: Male           Objective   Physical Exam  Neck has no adenopathy JVD or bruits.  Lungs are clear.  Heart has a regular rate rhythm without murmur rub or gallop.  Chest is nontender.  Abdomen soft nontender.  Patient with normal bowel sounds.  Back has no CVA tenderness.  Extremities I am unremarkable.  Procedures  My EKG interpretation was normal sinus rhythm at a rate of 86 with no acute ST change         ED Course      Results for orders placed or performed during the hospital encounter of 05/13/25   ECG 12 Lead Syncope    Collection Time: 05/13/25 12:12 PM   Result Value Ref Range    QT Interval 446 ms    QTC Interval  535 ms   Basic Metabolic Panel    Collection Time: 05/13/25 12:44 PM    Specimen: Blood   Result Value Ref Range    Glucose 104 (H) 65 - 99 mg/dL    BUN 14 6 - 20 mg/dL    Creatinine 1.03 (H) 0.57 - 1.00 mg/dL    Sodium 145 136 - 145 mmol/L    Potassium 4.1 3.5 - 5.2 mmol/L    Chloride 105 98 - 107 mmol/L    CO2 35.8 (H) 22.0 - 29.0 mmol/L    Calcium 8.8 8.6 - 10.5 mg/dL    BUN/Creatinine Ratio 13.6 7.0 - 25.0    Anion Gap 4.2 (L) 5.0 - 15.0 mmol/L    eGFR 70.2 >60.0 mL/min/1.73   CBC Auto Differential    Collection Time: 05/13/25 12:44 PM    Specimen: Blood   Result Value Ref Range    WBC 5.46 3.40 - 10.80 10*3/mm3    RBC 4.60 3.77 - 5.28 10*6/mm3    Hemoglobin 11.8 (L) 12.0 - 15.9 g/dL    Hematocrit 39.5 34.0 - 46.6 %    MCV 85.9 79.0 - 97.0 fL    MCH 25.7 (L) 26.6 - 33.0 pg    MCHC 29.9 (L) 31.5 - 35.7 g/dL    RDW 18.2 (H) 12.3 - 15.4 %    RDW-SD 57.4 (H) 37.0 - 54.0 fl    MPV 10.2 6.0 - 12.0 fL    Platelets 142 140 - 450 10*3/mm3    Neutrophil % 60.9 42.7 - 76.0 %    Lymphocyte % 24.4 19.6 - 45.3 %    Monocyte % 7.0 5.0 - 12.0 %    Eosinophil % 7.0 (H) 0.3 - 6.2 %    Basophil % 0.5 0.0 - 1.5 %    Immature Grans % 0.2 0.0 - 0.5 %    Neutrophils, Absolute 3.33 1.70 - 7.00 10*3/mm3    Lymphocytes, Absolute 1.33 0.70 - 3.10 10*3/mm3    Monocytes, Absolute 0.38 0.10 - 0.90 10*3/mm3    Eosinophils, Absolute 0.38 0.00 - 0.40 10*3/mm3    Basophils, Absolute 0.03 0.00 - 0.20 10*3/mm3    Immature Grans, Absolute 0.01 0.00 - 0.05 10*3/mm3    nRBC 0.0 0.0 - 0.2 /100 WBC   Scan Slide    Collection Time: 05/13/25 12:44 PM    Specimen: Blood   Result Value Ref Range    Poikilocytes Slight/1+ None Seen    WBC Morphology Normal Normal    Platelet Estimate Adequate Normal   Gold Top - SST    Collection Time: 05/13/25 12:44 PM   Result Value Ref Range    Extra Tube Hold for add-ons.    Blood Gas, Arterial -    Collection Time: 05/13/25 12:51 PM    Specimen: Arterial Blood   Result Value Ref Range    Site Left Radial     Melecio's  Test Positive     pH, Arterial 7.379 7.350 - 7.450 pH units    pCO2, Arterial 61.8 (H) 35.0 - 48.0 mm Hg    pO2, Arterial 75.9 (L) 83.0 - 108.0 mm Hg    HCO3, Arterial 36.5 (H) 21.0 - 28.0 mmol/L    Base Excess, Arterial 8.8 (H) 0.0 - 3.0 mmol/L    O2 Saturation, Arterial 94.2 94.0 - 98.0 %    CO2 Content 38.4 (H) 22 - 29 mmol/L    Barometric Pressure for Blood Gas      Modality Cannula     FIO2 28 %    Hemodilution No     PO2/FIO2 271 0 - 500   D-dimer, Quantitative    Collection Time: 05/13/25  1:23 PM    Specimen: Arm, Left; Blood   Result Value Ref Range    D-Dimer, Quantitative <0.27 0.00 - 0.50 MCGFEU/mL     XR Chest 1 View  Result Date: 5/13/2025  1. No convincing acute airspace process. 2. Nonspecific 1.5 cm lesion versus possible artifact projecting over the left posterior eighth rib. Consider outpatient chest CT for more accurate assessment. Electronically Signed: Richie Dillon MD  5/13/2025 1:26 PM EDT  Workstation ID: [id number]    XR ELBOW 2 VIEW LEFT  Result Date: 5/13/2025  Impression: Normal study. Electronically Signed: Paul Ayala MD  5/13/2025 12:53 PM EDT  Workstation ID: [id number]                                                     Medical Decision Making  My interpretation patient left elbow x-ray shows no fracture or dislocation.  X-ray of chest with no cardiomegaly fusion or infiltrate.  D-dimer was 0.27.  Blood gas shows pH be 7.37 pCO2 of 60 pO2 is 75.  CBC shows no leukocytosis no left shift and borderline anemia.  Metabolic panel shows no renal sufficiency or electrolyte abnormality.  Patient is fairly drowsy but easily arousable.  She will drop her O2 sats to 90 when she is sleeping and 98% when she is talking.  She will be discharged.  She will follow with her MD for recheck.    Amount and/or Complexity of Data Reviewed  Labs: ordered. Decision-making details documented in ED Course.  Radiology: ordered and independent interpretation performed.  ECG/medicine tests: ordered and  independent interpretation performed.    Risk  Prescription drug management.        Final diagnoses:   Weakness   Contusion of left elbow, initial encounter       ED Disposition  ED Disposition       ED Disposition   Discharge    Condition   Stable    Comment   --               No follow-up provider specified.       Medication List      No changes were made to your prescriptions during this visit.            Eric Blevins MD  05/13/25 [id number]

## 2025-05-13 NOTE — Clinical Note
[facility] EMERGENCY DEPARTMENT  [address] [place] [address]  Phone: [phone]    Jayshree Anderson was seen and treated in our emergency department on 5/13/2025.  She may return to work on 05/14/2025.         Thank you for choosing [facility].    Eric Blevins MD

## 2025-05-14 LAB
QT INTERVAL: 446 MS
QTC INTERVAL: 535 MS